# Patient Record
(demographics unavailable — no encounter records)

---

## 2018-02-02 NOTE — RAD
SINGLE VIEW OF THE CHEST

2/2/18

 

COMPARISON:  

2/14/09 and 8/20/13.

 

HISTORY: 

Dyspnea and chest pain.

 

FINDINGS:

Single view of the chest shows a normal sized cardiomediastinal silhouette. There is no evidence of c
onsolidation, mass, or pleural effusion. The bones are unremarkable.

 

IMPRESSION:

No evidence of acute cardiopulmonary disease.

 

POS: [facility]

## 2018-02-03 NOTE — DIS
DATE OF OBSERVATION:  02/02/2018

 

DATE OF DISCHARGE:  02/03/2018

 

PRIMARY CARE PHYSICIAN:  Lucian Gonzalez M.D.

 

DISCHARGE DIAGNOSES:

1.  Noncardiac chest pain.

2.  Hypertension.

3.  Systemic lupus erythematosus.

4.  Obesity.

 

CONSULTATIONS:  None.

 

PROCEDURES:  Nuclear stress test negative for reversible ischemia and negative for EKG changes.

 

HOSPITAL COURSE:  Ms. Sanchez is a 62-year-old female who presented to the emergency department 2 to 3 
weeks off and on chest pain.  She described the chest tightness.  Workup in the ER was negative.  We 
were called for admission.

 

HOSPITAL COURSE:  She was seen and examined.  She was placed in observation with Dr. Will Rose over
night.  Serial cardiac biomarkers were negative.  I took over the case in the morning.  A stress test
 was ordered and it was negative for inducible ischemia.  Blood pressure remained slightly elevated p
ost-removal of nitro paste.  Her Norvasc was increased to 10 mg.  She was discharged home in stable c
ondition with outpatient followup with Dr. Gonzalez.  She is supposed to call him on Monday, 02/05/2018 a
[employer].

 

PHYSICAL EXAMINATION:  The patient was seen and examined on the day of discharge.

 

Discharge plan and disposition was discussed with the patient face to face with the family at the bed
side.

 

DISCHARGE MEDICATIONS:

1.  Norvasc, increased to 10 mg p.o. daily.

2.  Aspirin 81 mg over-the-counter recommended.

 

Followup appointment with Dr. Gonzalez.  She is supposed to call his office Monday, 02/05/2018.

 

DISCHARGE ACTIVITY:  Per cardiopulmonary limits.

 

DISCHARGE DIET:  Heart healthy recommended.

 

DISCHARGE CONDITION:  Good.

 

DISPOSITION:  To be discharged home via private vehicle.

## 2018-02-03 NOTE — HP
CHIEF COMPLAINT:  Chest pain.

 

HISTORY OF PRESENT ILLNESS:  The patient is a 62-year-old female who presents with on and off chest p
ain for the last 2 to 3 weeks.  She was recently started on blood pressure medications and she is tigist
ing 5 mg of Norvasc once a day.  The patient describes chest pain as exertional and is accompanied by
 shortness of breath.  Chest pain upon presentation to the ER was 10/10 and has improved with rest.  
The patient denied any diaphoresis.  She has had some accompanying nausea as well.  The patient state
s that prior to this, the patient did not have any chest pain.  She does have a family history with b
oth her parents and her brothers and sisters with heart issues.

 

PAST MEDICAL HISTORY:  The patient is significant for lupus.

 

PAST SURGICAL HISTORY:  The patient has had cholecystectomy and hysterectomy.

 

SOCIAL HISTORY:  Negative for tobacco or alcohol use.

 

ALLERGIES:  The patient is allergic to CODEINE, SULFA, and TYLENOL.

 

OUTPATIENT MEDICATIONS:  The patient is on Norvasc 5 mg once a day.

 

REVIEW OF SYSTEMS:  Please see HPI.  Rest of 14-point review of systems is negative.

 

PHYSICAL EXAMINATION:

VITAL SIGNS:  Temperature is 97.8, pulse 70, respirations 14, blood pressure 146/68, patient satting 
97% on room air.

GENERAL:  The patient is awake, alert, oriented x3, in no acute distress.

HEENT:  Pupils equal, round, reactive to light and accommodation.  Extraocular muscles intact.  TMs a
re clear.  No erythema in throat.

NECK:  No JVD, lymphadenopathy.

HEART:  Regular rate and rhythm.

LUNGS:  Clear to auscultation bilaterally.

ABDOMEN:  Positive bowel sounds.  Soft, nontender, nondistended.

EXTREMITIES:  No clubbing, cyanosis or edema.

 

LABORATORY AND X-RAY DATA:  The patient's CBC, white count is 8.7, H and H 15 and 46 with a platelet 
of 345.  Sodium is 140, potassium 3.8, chloride 109, bicarbonate 21, BUN 11, creatinine 0.7 with a tr
oponin of less than 0.010.  Lipase is 23.  The patient's chest x-ray did not show any abnormalities. 
 No pneumonias were seen.

 

ASSESSMENT AND PLAN:

1.  Chest pain.  Continue with serial EKGs and enzymes.  Further risk stratification depending on res
ults.  Continue on aspirin.

2.  Hypertension.  Continue on Norvasc and add beta blockers for better blood pressure control.

3.  Code status:  The patient is a FULL CODE.

## 2018-02-03 NOTE — NM
NUCLEAR MEDICINE CARDIAC STRESS ONLY STUDY WITH EJECTION FRACTION AND WALL MOTION:

2/3/18

 

HISTORY: 

62-year-old female with history of chest pain, shortness of breath and family history of coronary art
mariposa disease. 

 

Exam was performed as Adenosine Sestamibi study. 

 

Patient was injected with 29.1 millicuries technetium 99m Sestamibi intravenously for stress only. No
 scan evidence for infarct or ischemia. 

 

LHR - 0.40.

EDV - 70 mL.

EF -  72%.

Myocardial perfusion wall motion: Wall motion is unremarkable.

 

IMPRESSION:  

Unremarkable  cardiac stress only with unremarkable ejection fraction and wall motion. No scan eviden
ce for infarct or ischemia.

 

POS: [facility]

## 2020-04-30 NOTE — PDOC.HHP
Hospitalist HPI





- History of Present Illness


Worsening shortness of breath


History of Present Illness: 


Patient admitted for chest pain rule out however upon assessment she denies 

having chest pain.  States she has been experiencing gradually worsening 

shortness of breath for the last 3 days, worse with exertion and now present at 

rest.  Reports having a cough for 3 days which she states is dry.  She has felt 

nauseated and attributed this due elevated blood pressure or recent headaches 

which she usually gets if her BP is elevated. 





Patient works on [employer].  








ED Course: 





In the ED she had an EKG done which showed NSR, HR 73.  LBBB. No ST changes or 

T wave abnormalities. 





She was given 324 mg of aspirin, Nitro bid and 500 mLs of normal saline IV. 





CXR done in the ER showed a subtle infiltrate in the right lower lung.





Initial trop negative.  WCC normal. LFTs within normal range.  Glucose 206. 


CK 42. D-dimer was negative. 





Hospitalist ROS





- Review of Systems


Constitutional: reports: malaise.  denies: fever, chills, sweats, weakness, 

other


Eyes: denies: pain, vision change, conjunctivae inflammation, eyelid 

inflammation, redness, other


ENT: denies: ear pain, ear discharge, nose pain, nose discharge, nose congestion

, mouth pain, mouth swelling, throat pain, throat swelling, other


Respiratory: reports: cough, dry, other (difficulty taking deep breaths, 

worsens her cough)


Cardiovascular: reports: other (chest tightness with exertion and attempting to 

take deep breaths.).  denies: chest pain, palpitations, orthopnea, paroxysmal 

noc. dyspnea, edema, light headedness


Gastrointestinal: reports: nausea, diarrhea (chronic due to metformin, unchanged

)


Genitourinary: denies: dysuria, frequency, incontinence, hematuria, retention, 

other


Musculoskeletal: denies: neck pain, shoulder pain, arm pain, back pain, hand 

pain, leg pain, foot pain, other


Skin: denies: rash, lesions, enriqueta, bruising, other


Neurological: denies: weakness, numbness, incoordination, change in speech, 

confusion, seizures, other





- Medication


Medications: 





ALLERGIES:  Acetaminophen, Codene, Sulfa





CURRENT MEDICATIONS:


1.  Valsartan 80 mg PO daily. 


2.  Metformin 500 mg PO BID. 





Hospitalist History





- Past Medical History


Source: patient


Cardiac: reports: HTN, Other (Obesity)


Rheumatologic: reports: Other (Lupus)


Endocrine: reports: Diabetes





- Past Surgical History


Past Surgical History: reports: Cholecystectomy, Hysterectomy





- Family History


Family History: reports: Other (CAD in her brothers who all had MIs)





- Social History


Smoking Status: Never smoker


Alcohol: reports: None


Drugs: reports: none


Living Situation: With Family


Activity level: independent ambulation





- Exam


General - other findings: Visibly short of breath when speaking in long 

sentences. 


Eye: PERRL


Neck: supple, no lymphadenopathy


Heart: RRR, normal peripheral pulses


Respiratory: CTAB, no wheezes, no rales, no tachypnea


Respiratory - other findings: diminished at bases, she is unable to take deep 

enough breaths due to cough


Gastrointestinal: soft, non-tender, non-distended, normal bowel sounds, no 

guarding, no rigidity


Extremities: no edema


Skin: no lesions, no rashes


Neurological: cranial nerve grossly intact


Musculoskeletal: normal tone, normal strength, no muscle wasting


Psychiatric: normal affect, normal behavior, A&O x 3





Hospitalist Results





- Labs


Result Diagrams: 


 04/30/20 18:12





 04/30/20 18:12


Lab results: 


 











WBC  8.2 thou/uL (4.8-10.8)   04/30/20  18:12    


 


Hgb  15.5 g/dL (12.0-16.0)   04/30/20  18:12    


 


Hct  47.3 % (36.0-47.0)  H  04/30/20  18:12    


 


MCV  92.4 fL (78.0-98.0)   04/30/20  18:12    


 


Plt Count  322 thou/uL (130-400)   04/30/20  18:12    


 


Neutrophils %  64.2 % (42.0-75.0)   04/30/20  18:12    


 


Sodium  137 mmol/L (136-145)   04/30/20  18:12    


 


Potassium  3.9 mmol/L (3.5-5.1)   04/30/20  18:12    


 


Chloride  102 mmol/L ()   04/30/20  18:12    


 


Carbon Dioxide  28 mmol/L (23-31)   04/30/20  18:12    


 


BUN  14 mg/dL (9.8-20.1)   04/30/20  18:12    


 


Creatinine  0.80 mg/dL (0.6-1.1)   04/30/20  18:12    


 


Glucose  206 mg/dL ()  H  04/30/20  18:12    


 


Calcium  9.6 mg/dL (7.8-10.44)   04/30/20  18:12    


 


Total Bilirubin  0.2 mg/dL (0.2-1.2)   04/30/20  18:12    


 


AST  16 U/L (5-34)   04/30/20  18:12    


 


ALT  24 U/L (8-55)   04/30/20  18:12    


 


Alkaline Phosphatase  75 U/L ()   04/30/20  18:12    


 


Creatine Kinase  42 U/L ()   04/30/20  18:12    


 


Troponin I  0.010 ng/mL (< 0.028)   04/30/20  21:18    


 


B-Natriuretic Peptide  Less than  10.0 pg/mL (0-100)   04/30/20  18:12    


 


Serum Total Protein  7.0 g/dL (6.0-8.3)   04/30/20  18:12    


 


Albumin  4.2 g/dL (3.4-4.8)   04/30/20  18:12    


 


Lipase  25 U/L (8-78)   04/30/20  18:12    














Hospitalist H&P A/P





- Problem


(1) Shortness of breath


Code(s): R06.02 - SHORTNESS OF BREATH   Status: Acute   





(2) Cough


Code(s): R05 - COUGH   Status: Acute   





(3) Nausea


Code(s): R11.0 - NAUSEA   Status: Acute   





(4) Headache


Code(s): R51 - HEADACHE   Status: Acute   





(5) Essential hypertension


Code(s): I10 - ESSENTIAL (PRIMARY) HYPERTENSION   Status: Chronic   





(6) Diabetes mellitus


Code(s): E11.9 - TYPE 2 DIABETES MELLITUS WITHOUT COMPLICATIONS   Status: 

Chronic   


Qualifiers: 


   Diabetes mellitus type: type 2   Diabetes mellitus complication status: 

without complication 





(7) Obesity


Code(s): E66.9 - OBESITY, UNSPECIFIED   Status: Chronic   





(8) Family history of coronary artery disease


Code(s): Z82.49 - FAMILY HX OF ISCHEM HEART DIS AND OTH DIS OF THE CIRC SYS   

Status: Chronic   





- Plan


Plan: 


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 

hospital, patient will be tested for COVID as per discussion with Dr. Archer.

  


She has had gradual worsening in shortness of breath and unable to take deep 

breaths with questionable infiltrate on CXR. 


No evidence of fluid overload or history of HF.


BNP added on. Consider echo. 


Consider CT imaging if clinically worsens. 


Will add lactic acid. 


Cardiac monitoring and continue to trend troponins. 


ISS initiated and we will continue to monitor glucose. 


Monitor BP.


Reconcile home medications once verified.





CODE STATUS: FULL


Surrogate decision maker is her  Rob Sanchez.

## 2020-04-30 NOTE — RAD
PORTABLE CHEST:

4/30/20

 

HISTORY: 

Dyspnea. 

 

Comparison made to a film of 2018. 

 

The exam is limited due to soft tissue attenuation from body habitus. There is question of subtle inf
iltrate in the right lower lung. 

 

Lungs are otherwise clear. Heart and mediastinum unremarkable. 

 

IMPRESSION: 

Question subtle infiltrate in the right lower lung. If there is suspicion of pneumonia, recommend rafa
rt term follow-up with treatment. 

 

POS: [facility]

## 2020-05-01 NOTE — PDOC.HOSPP
- Subjective


Encounter Date: 05/01/20


Encounter Time: 10:31


Subjective: 





Ms. Sanchez was seen today in follow-up of shortness of breath. She continues to 

feel a bit dyspneic. She denies chest pain. She continues to have some cough 

off and on. she denies any leg pain or swelling.





- Objective


Vital Signs & Weight: 


 Vital Signs (12 hours)











  Temp Pulse Resp BP Pulse Ox


 


 05/01/20 08:04  96.6 F L  74  19  133/67  96


 


 05/01/20 04:00  96.5 F L  71  16  170/80 H  95


 


 05/01/20 00:00     144/66 H 








 Weight











Weight                         205 lb 2 oz














I&O: 


 











 04/30/20 05/01/20 05/02/20





 06:59 06:59 06:59


 


Intake Total  480 


 


Output Total  275 


 


Balance  205 











Result Diagrams: 


 05/01/20 04:02





 05/01/20 04:02


Additional Labs: 


 Accuchecks











  05/01/20





  06:27


 


POC Glucose  212 H














Hospitalist ROS





- Medication


Medications: 


Active Medications











Generic Name Dose Route Start Last Admin





  Trade Name Freq  PRN Reason Stop Dose Admin


 


Azithromycin  500 mg  05/01/20 05:00  05/01/20 06:12





  Zithromax  PO   500 mg





  0500 JASMINA   Administration





     





     





     





     


 


Ceftriaxone Sodium 1 gm/  100 mls @ 200 mls/hr  05/01/20 05:00  05/01/20 06:11





  Sodium Chloride  IVPB   100 mls





  0500 JASMINA   Administration





     





     





     





     


 


Ibuprofen  400 mg  04/30/20 22:11  04/30/20 23:30





  Motrin  PO   400 mg





  Q4H PRN   Administration





  Fever/Mild Pain   





     





     





     


 


Promethazine HCl  25 mg  05/01/20 07:37  05/01/20 08:00





  Phenergan  PO   25 mg





  Q6H PRN   Administration





  Nausea/Vomiting   





     





     





     


 


Valsartan  80 mg  05/01/20 09:00  05/01/20 07:50





  Diovan  PO   80 mg





  DAILY JASMINA   Administration





     





     





     





     














- Exam


Eye: PERRL


Heart: RRR (heart tones are distant), no murmur, no gallops, no rubs, normal 

peripheral pulses


Respiratory: CTAB, no wheezes, no rales, no ronchi, normal chest expansion


Gastrointestinal: soft, non-tender, non-distended, normal bowel sounds


Extremities: no cyanosis, no clubbing (good dorsalis pedis plses bilaterally, 

good capillary refill in the toes, feet warm and dry, no skin lesions), no edema





Hosp A/P


(1) Cough


Code(s): R05 - COUGH   Status: Acute   





(2) Shortness of breath


Code(s): R06.02 - SHORTNESS OF BREATH   Status: Acute   





(3) Diabetes mellitus


Code(s): E11.9 - TYPE 2 DIABETES MELLITUS WITHOUT COMPLICATIONS   Status: 

Chronic   


Qualifiers: 


   Diabetes mellitus type: type 2   Diabetes mellitus complication status: 

without complication 





(4) Essential hypertension


Code(s): I10 - ESSENTIAL (PRIMARY) HYPERTENSION   Status: Chronic   





(5) Obesity


Code(s): E66.9 - OBESITY, UNSPECIFIED   Status: Chronic   


Qualifiers: 


   Body mass index: BMI 35.0-35.9 





- Plan





* Cough and shortness of breath- possible bronchitis vs. early pneumonia- 

continue Azithromycin and Rocephin


* COVID screen is pedning


* Continue droplet isolation


* HTN- blood pressure was elevated this AM, but now trending down- continue 

Valsartan, and prn medications


* DM- continue SSI


*

## 2020-05-01 NOTE — EKG
Test Reason : 

Blood Pressure : ***/*** mmHG

Vent. Rate : 073 BPM     Atrial Rate : 073 BPM

   P-R Int : 160 ms          QRS Dur : 146 ms

    QT Int : 414 ms       P-R-T Axes : 042 -10 061 degrees

   QTc Int : 456 ms

 

Normal sinus rhythm

Left bundle branch block

Abnormal ECG

 

Confirmed by JOSE J DAILEY, DINORAH ([id number]),  MANJEET BYRNES ([id number]) on 5/1/2020 2:33:12 PM

 

Referred By:             Confirmed By:DINORAH LUX MD

## 2020-05-02 NOTE — RAD
CHEST ONE VIEW:

 

HISTORY:

Shortness of breath.

 

COMPARISON:

Radiograph from 04/30/2020.

 

FINDINGS:

The lungs are clear. No pneumothorax. No effusion. Mild lung hypoinflation with atelectasis. No acute
 osseous abnormality.

 

IMPRESSION:

No acute intrathoracic abnormality.

 

POS: [facility]

## 2020-05-02 NOTE — PDOC.HOSPP
- Subjective


Encounter Date: 05/02/20


Encounter Time: 12:30


Subjective: 





pt up in bed still complains of sob. However this is subjective. She is not 

using any oxygen. 





- Objective


Vital Signs & Weight: 


 Vital Signs (12 hours)











  Temp Pulse Resp BP BP Pulse Ox


 


 05/02/20 11:23  98.1 F  73  16   184/82 H  99


 


 05/02/20 07:06  97.8 F  73  16  182/79 H   98


 


 05/02/20 04:00  97.7 F  77  18  133/61   93 L








 Weight











Weight                         205 lb 11.2 oz














I&O: 


 











 05/01/20 05/02/20 05/03/20





 06:59 06:59 06:59


 


Intake Total 480 1560 


 


Output Total 275 1420 


 


Balance 205 140 











Result Diagrams: 


 05/02/20 04:03





 05/02/20 04:03


Additional Labs: 


 Accuchecks











  05/02/20 05/02/20 05/01/20





  10:51 05:42 20:44


 


POC Glucose  177 H  159 H  266 H














  05/01/20





  16:37


 


POC Glucose  142 H














Hospitalist ROS





- Review of Systems


ENT: reports: other (hoarsness)


Cardiovascular: denies: chest pain, palpitations, orthopnea, paroxysmal noc. 

dyspnea, edema, light headedness, other


Gastrointestinal: denies: nausea, vomiting, abdominal pain, diarrhea, 

constipation, melena, hematochezia, other


Genitourinary: denies: dysuria, frequency, incontinence, hematuria, retention, 

other





- Medication


Medications: 


Active Medications











Generic Name Dose Route Start Last Admin





  Trade Name Freq  PRN Reason Stop Dose Admin


 


Azithromycin  500 mg  05/01/20 05:00  05/02/20 05:21





  Zithromax  PO   500 mg





  0500 JASMINA   Administration





     





     





     





     


 


Enoxaparin Sodium  40 mg  05/02/20 09:00  05/02/20 07:57





  Lovenox  SC   40 mg





  0900 JASMINA   Administration





     





     





     





     


 


Famotidine  20 mg  05/01/20 21:00  05/02/20 07:57





  Pepcid  PO   Not Given





  BID JASMINA   





     





     





     





     


 


Ceftriaxone Sodium 1 gm/  100 mls @ 200 mls/hr  05/01/20 05:00  05/02/20 05:21





  Sodium Chloride  IVPB   100 mls





  0500 JASMINA   Administration





     





     





     





     


 


Ibuprofen  400 mg  04/30/20 22:11  04/30/20 23:30





  Motrin  PO   400 mg





  Q4H PRN   Administration





  Fever/Mild Pain   





     





     





     


 


Metformin HCl  500 mg  05/02/20 09:00  05/02/20 09:06





  Glucophage  PO   500 mg





  BID JASMINA   Administration





     





     





     





     


 


Promethazine HCl  25 mg  05/01/20 07:37  05/01/20 08:00





  Phenergan  PO   25 mg





  Q6H PRN   Administration





  Nausea/Vomiting   





     





     





     


 


Valsartan  80 mg  05/01/20 09:00  05/02/20 07:57





  Diovan  PO   80 mg





  DAILY JASMINA   Administration





     





     





     





     














- Exam


Neck: negative: supple, symmetric, no JVD, no thyromegaly, no lymphadenopathy, 

no carotid bruit, JVD


Heart: negative: RRR, no murmur, no gallops, no rubs, normal peripheral pulses, 

irregular, diminshed peripheral pulses, murmur present, II/IV, III/IV


Respiratory: negative: CTAB, no wheezes, no rales, no ronchi, normal chest 

expansion, no tachypnea, normal percussion, rales, rhonchi, tachypneic, wheezes





Hosp A/P


(1) Shortness of breath


Code(s): R06.02 - SHORTNESS OF BREATH   Status: Acute   





(2) Diabetes mellitus


Code(s): E11.9 - TYPE 2 DIABETES MELLITUS WITHOUT COMPLICATIONS   Status: 

Chronic   


Qualifiers: 


   Diabetes mellitus type: type 2   Diabetes mellitus complication status: 

without complication 





(3) Essential hypertension


Code(s): I10 - ESSENTIAL (PRIMARY) HYPERTENSION   Status: Chronic   





(4) Obesity


Code(s): E66.9 - OBESITY, UNSPECIFIED   Status: Chronic   


Qualifiers: 


   Body mass index: BMI 35.0-35.9 





- Plan





pt's repeat cxr improved, will give one dose of lasix to see if it helps her 

sob. She has no elevated wbc/or worsening lower ext edema. unclear etiology for 

her sob. She is not hypoxic. will ask the nurse to ambulate her in hallway. she 

does have hoarseness and states that at times she gets that. she has no hx of 

smoking. will get a echo to evalutae. Her covid is negative. trops negative, 

low d-dimer. she does have a non productive cough which could be due to her bp 

meds. pt states that at baseline she is active.

## 2020-05-04 NOTE — DIS
DATE OF ADMISSION:  2020



DATE OF DISCHARGE:  2020



DISCHARGE DIAGNOSES:  As of the followin. Shortness of breath, most likely secondary to flash pulmonary edema versus

possible bronchitis. 

2. Hypertension.

3. Obesity.

4. Diabetes.

5. Hypertension.



HOSPITAL COURSE:  The patient is a 64-year-old female who initially presented to the

hospital with complaints of shortness of breath and elevated BP.  At this time,

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

COVID etiology.  Her COVID test was negative.  Her chest x-ray initially indicated

some may be a possible infiltrate in the right lower lung.  However, upon repeat

x-ray on , that infiltrate actually improved.  She was given also some diuretics

x2 doses.  Initially when she presented to the hospital, her blood pressure was

systolic in the 200s.  The patient's blood pressure here has been monitored and also

a new antihypertensive has been added.  She was never hypoxic.  We did an

echocardiogram which indicated an EF of 60% to 65%, grade 1/3 diastolic dysfunction,

mild dilated left atrium and mild regurgitation.  She also had a D-dimer test which

was negative, and that is why no CTA was done.  She had no significant leukocytosis.

 She had no neutrophilia either.  Her troponin x3 was completely normal.  Her BNP

also was normal.  Her procalcitonin was 0.02.  She felt great after the diuretics.

She was able to ambulate without any issues, and she will be discharged home. 



HOME MEDICATIONS:  Will be,

1. Norvasc 10 mg daily.

2. Metformin 500 mg twice a day.

3. Valsartan 80 mg daily.

4. Doxycycline 100 mg twice a day for 5 days.



PHYSICAL EXAMINATION:

VITAL SIGNS:  97.6, 75, 18, 96% on room air, 127/60. 

GENERAL:  She is awake, alert, and oriented x3.  Does not appear in any distress. 

CV:  S1, S2 present.  No murmurs, rubs, or gallops. 



Again, she will be discharged home.  She will follow up with her primary.  She has

been in normal sinus rhythm on the monitor. 



30 minutes was spent discharging this patient.







Job ID:  [id number]